# Patient Record
Sex: MALE | Race: WHITE | NOT HISPANIC OR LATINO | Employment: FULL TIME | URBAN - METROPOLITAN AREA
[De-identification: names, ages, dates, MRNs, and addresses within clinical notes are randomized per-mention and may not be internally consistent; named-entity substitution may affect disease eponyms.]

---

## 2021-05-18 ENCOUNTER — NURSE TRIAGE (OUTPATIENT)
Dept: INTERNAL MEDICINE | Age: 31
End: 2021-05-18

## 2021-05-18 ENCOUNTER — EXTERNAL RECORD (OUTPATIENT)
Dept: HEALTH INFORMATION MANAGEMENT | Facility: OTHER | Age: 31
End: 2021-05-18

## 2021-05-18 ENCOUNTER — WALK IN (OUTPATIENT)
Dept: URGENT CARE | Age: 31
End: 2021-05-18

## 2021-05-18 DIAGNOSIS — R10.9 LEFT SIDED ABDOMINAL PAIN: Primary | ICD-10-CM

## 2021-05-18 DIAGNOSIS — R19.7 NAUSEA VOMITING AND DIARRHEA: ICD-10-CM

## 2021-05-18 DIAGNOSIS — F10.10 ALCOHOL ABUSE: ICD-10-CM

## 2021-05-18 DIAGNOSIS — R11.2 NAUSEA VOMITING AND DIARRHEA: ICD-10-CM

## 2021-05-18 DIAGNOSIS — F14.10 COCAINE ABUSE (CMD): ICD-10-CM

## 2021-05-18 PROCEDURE — 99202 OFFICE O/P NEW SF 15 MIN: CPT | Performed by: FAMILY MEDICINE

## 2021-05-18 ASSESSMENT — PAIN SCALES - GENERAL: PAINLEVEL: 5-6

## 2021-05-27 VITALS
OXYGEN SATURATION: 99 % | SYSTOLIC BLOOD PRESSURE: 130 MMHG | HEART RATE: 70 BPM | RESPIRATION RATE: 20 BRPM | TEMPERATURE: 96.7 F | DIASTOLIC BLOOD PRESSURE: 82 MMHG

## 2023-04-23 ENCOUNTER — WALK IN (OUTPATIENT)
Dept: URGENT CARE | Age: 33
End: 2023-04-23
Attending: EMERGENCY MEDICINE

## 2023-04-23 VITALS
WEIGHT: 190 LBS | TEMPERATURE: 98.2 F | DIASTOLIC BLOOD PRESSURE: 67 MMHG | HEART RATE: 65 BPM | SYSTOLIC BLOOD PRESSURE: 113 MMHG | OXYGEN SATURATION: 96 % | RESPIRATION RATE: 18 BRPM | BODY MASS INDEX: 28.79 KG/M2 | HEIGHT: 68 IN

## 2023-04-23 DIAGNOSIS — H61.23 BILATERAL IMPACTED CERUMEN: Primary | ICD-10-CM

## 2023-04-23 DIAGNOSIS — H92.02 OTALGIA OF LEFT EAR: ICD-10-CM

## 2023-04-23 DIAGNOSIS — H60.392 OTHER INFECTIVE ACUTE OTITIS EXTERNA OF LEFT EAR: ICD-10-CM

## 2023-04-23 DIAGNOSIS — H91.92 DECREASED HEARING OF LEFT EAR: ICD-10-CM

## 2023-04-23 PROCEDURE — 99204 OFFICE O/P NEW MOD 45 MIN: CPT

## 2023-04-23 RX ORDER — CIPROFLOXACIN AND DEXAMETHASONE 3; 1 MG/ML; MG/ML
4 SUSPENSION/ DROPS AURICULAR (OTIC) 2 TIMES DAILY
Qty: 7.5 ML | Refills: 0 | Status: SHIPPED | OUTPATIENT
Start: 2023-04-23 | End: 2023-04-30

## 2023-04-23 ASSESSMENT — ENCOUNTER SYMPTOMS
FEVER: 0
VOMITING: 0
DIZZINESS: 0
HEADACHES: 0
ABDOMINAL PAIN: 0
EYE REDNESS: 0
SHORTNESS OF BREATH: 0
NAUSEA: 0
DIARRHEA: 0
SPUTUM PRODUCTION: 0
HEMOPTYSIS: 0
WHEEZING: 0
SORE THROAT: 0
DIAPHORESIS: 0
CHILLS: 0
WEAKNESS: 0
EYE PAIN: 0
EYE DISCHARGE: 0
COUGH: 0

## 2023-04-23 ASSESSMENT — PAIN SCALES - GENERAL: PAINLEVEL: 10

## 2023-08-11 ENCOUNTER — WALK IN (OUTPATIENT)
Dept: URGENT CARE | Age: 33
End: 2023-08-11
Attending: FAMILY MEDICINE

## 2023-08-11 VITALS
OXYGEN SATURATION: 99 % | TEMPERATURE: 98.1 F | RESPIRATION RATE: 18 BRPM | SYSTOLIC BLOOD PRESSURE: 124 MMHG | HEART RATE: 70 BPM | DIASTOLIC BLOOD PRESSURE: 76 MMHG

## 2023-08-11 DIAGNOSIS — R10.84 GENERALIZED ABDOMINAL PAIN: ICD-10-CM

## 2023-08-11 DIAGNOSIS — R19.7 DIARRHEA, UNSPECIFIED TYPE: Primary | ICD-10-CM

## 2023-08-11 DIAGNOSIS — R68.83 CHILLS: ICD-10-CM

## 2023-08-11 LAB
INTERNAL PROCEDURAL CONTROLS ACCEPTABLE: YES
S PYO AG THROAT QL IA.RAPID: NEGATIVE

## 2023-08-11 PROCEDURE — 99213 OFFICE O/P EST LOW 20 MIN: CPT

## 2023-08-11 PROCEDURE — 87880 STREP A ASSAY W/OPTIC: CPT | Performed by: FAMILY MEDICINE

## 2023-08-11 ASSESSMENT — ENCOUNTER SYMPTOMS
NAUSEA: 0
SINUS PRESSURE: 0
FATIGUE: 1
STRIDOR: 0
ABDOMINAL PAIN: 1
VOMITING: 0
VOICE CHANGE: 0
HEADACHES: 0
COUGH: 0
SPEECH DIFFICULTY: 0
POLYDIPSIA: 0
POLYPHAGIA: 0
BLOOD IN STOOL: 0
SORE THROAT: 0
CONSTIPATION: 0
AGITATION: 0
EYE PAIN: 0
COLOR CHANGE: 0
WOUND: 0
DIARRHEA: 1
ADENOPATHY: 0
BACK PAIN: 0
WEAKNESS: 0
SHORTNESS OF BREATH: 0
NERVOUS/ANXIOUS: 0
EYE DISCHARGE: 0
BRUISES/BLEEDS EASILY: 0
CHEST TIGHTNESS: 0
WHEEZING: 0
DIZZINESS: 0
EYE REDNESS: 0
SINUS PAIN: 0
FEVER: 0

## 2023-08-11 ASSESSMENT — PAIN SCALES - GENERAL: PAINLEVEL: 4

## 2023-09-11 ENCOUNTER — APPOINTMENT (OUTPATIENT)
Dept: GENERAL RADIOLOGY | Age: 33
End: 2023-09-11
Attending: EMERGENCY MEDICINE

## 2023-09-11 ENCOUNTER — HOSPITAL ENCOUNTER (EMERGENCY)
Age: 33
Discharge: HOME | End: 2023-09-11
Attending: EMERGENCY MEDICINE

## 2023-09-11 VITALS
WEIGHT: 195.22 LBS | OXYGEN SATURATION: 96 % | SYSTOLIC BLOOD PRESSURE: 126 MMHG | HEART RATE: 54 BPM | TEMPERATURE: 97.6 F | DIASTOLIC BLOOD PRESSURE: 76 MMHG | BODY MASS INDEX: 29.59 KG/M2 | RESPIRATION RATE: 14 BRPM | HEIGHT: 68 IN

## 2023-09-11 DIAGNOSIS — R07.9 CHEST PAIN WITH LOW RISK FOR CARDIAC ETIOLOGY: Primary | ICD-10-CM

## 2023-09-11 LAB
ALBUMIN SERPL-MCNC: 3.6 G/DL (ref 3.6–5.1)
ALBUMIN/GLOB SERPL: 1.1 {RATIO} (ref 1–2.4)
ALP SERPL-CCNC: 97 UNITS/L (ref 45–117)
ALT SERPL-CCNC: 30 UNITS/L
AMPHETAMINES UR QL SCN>500 NG/ML: NEGATIVE
ANION GAP SERPL CALC-SCNC: 14 MMOL/L (ref 7–19)
APPEARANCE UR: CLEAR
AST SERPL-CCNC: 19 UNITS/L
BARBITURATES UR QL SCN>200 NG/ML: NEGATIVE
BASOPHILS # BLD: 0 K/MCL (ref 0–0.3)
BASOPHILS NFR BLD: 1 %
BENZODIAZ UR QL SCN>200 NG/ML: NEGATIVE
BILIRUB SERPL-MCNC: 0.2 MG/DL (ref 0.2–1)
BILIRUB UR QL STRIP: NEGATIVE
BUN SERPL-MCNC: 11 MG/DL (ref 6–20)
BUN/CREAT SERPL: 14 (ref 7–25)
BZE UR QL SCN>150 NG/ML: NEGATIVE
CALCIUM SERPL-MCNC: 8.6 MG/DL (ref 8.4–10.2)
CANNABINOIDS UR QL SCN>50 NG/ML: NEGATIVE
CHLORIDE SERPL-SCNC: 103 MMOL/L (ref 97–110)
CO2 SERPL-SCNC: 26 MMOL/L (ref 21–32)
COLOR UR: YELLOW
CREAT SERPL-MCNC: 0.79 MG/DL (ref 0.67–1.17)
D DIMER PPP FEU-MCNC: 0.2 MG/L (FEU)
DEPRECATED RDW RBC: 38.3 FL (ref 39–50)
EGFRCR SERPLBLD CKD-EPI 2021: >90 ML/MIN/{1.73_M2}
EOSINOPHIL # BLD: 0.3 K/MCL (ref 0–0.5)
EOSINOPHIL NFR BLD: 4 %
ERYTHROCYTE [DISTWIDTH] IN BLOOD: 12.7 % (ref 11–15)
FASTING DURATION TIME PATIENT: ABNORMAL H
FENTANYL UR QL SCN: NEGATIVE
GLOBULIN SER-MCNC: 3.2 G/DL (ref 2–4)
GLUCOSE SERPL-MCNC: 130 MG/DL (ref 70–99)
GLUCOSE UR STRIP-MCNC: NEGATIVE MG/DL
HCT VFR BLD CALC: 41.9 % (ref 39–51)
HGB BLD-MCNC: 14.3 G/DL (ref 13–17)
HGB UR QL STRIP: NEGATIVE
IMM GRANULOCYTES # BLD AUTO: 0 K/MCL (ref 0–0.2)
IMM GRANULOCYTES # BLD: 0 %
KETONES UR STRIP-MCNC: NEGATIVE MG/DL
LEUKOCYTE ESTERASE UR QL STRIP: NEGATIVE
LYMPHOCYTES # BLD: 2.5 K/MCL (ref 1–4.8)
LYMPHOCYTES NFR BLD: 34 %
MCH RBC QN AUTO: 28.7 PG (ref 26–34)
MCHC RBC AUTO-ENTMCNC: 34.1 G/DL (ref 32–36.5)
MCV RBC AUTO: 84.1 FL (ref 78–100)
MONOCYTES # BLD: 0.9 K/MCL (ref 0.3–0.9)
MONOCYTES NFR BLD: 12 %
NEUTROPHILS # BLD: 3.7 K/MCL (ref 1.8–7.7)
NEUTROPHILS NFR BLD: 49 %
NITRITE UR QL STRIP: NEGATIVE
NRBC BLD MANUAL-RTO: 0 /100 WBC
OPIATES UR QL SCN>300 NG/ML: NEGATIVE
PCP UR QL SCN>25 NG/ML: NEGATIVE
PH UR STRIP: 6 [PH] (ref 5–7)
PLATELET # BLD AUTO: 234 K/MCL (ref 140–450)
POTASSIUM SERPL-SCNC: 3.5 MMOL/L (ref 3.4–5.1)
PROT SERPL-MCNC: 6.8 G/DL (ref 6.4–8.2)
PROT UR STRIP-MCNC: NEGATIVE MG/DL
RBC # BLD: 4.98 MIL/MCL (ref 4.5–5.9)
SODIUM SERPL-SCNC: 139 MMOL/L (ref 135–145)
SP GR UR STRIP: 1.02 (ref 1–1.03)
TROPONIN I SERPL DL<=0.01 NG/ML-MCNC: 7 NG/L
UROBILINOGEN UR STRIP-MCNC: 0.2 MG/DL
WBC # BLD: 7.5 K/MCL (ref 4.2–11)

## 2023-09-11 PROCEDURE — 80307 DRUG TEST PRSMV CHEM ANLYZR: CPT

## 2023-09-11 PROCEDURE — 81003 URINALYSIS AUTO W/O SCOPE: CPT | Performed by: EMERGENCY MEDICINE

## 2023-09-11 PROCEDURE — 99285 EMERGENCY DEPT VISIT HI MDM: CPT

## 2023-09-11 PROCEDURE — 10004651 HB RX, NO CHARGE ITEM: Performed by: EMERGENCY MEDICINE

## 2023-09-11 PROCEDURE — 85025 COMPLETE CBC W/AUTO DIFF WBC: CPT | Performed by: EMERGENCY MEDICINE

## 2023-09-11 PROCEDURE — 80053 COMPREHEN METABOLIC PANEL: CPT | Performed by: EMERGENCY MEDICINE

## 2023-09-11 PROCEDURE — 71045 X-RAY EXAM CHEST 1 VIEW: CPT

## 2023-09-11 PROCEDURE — 93005 ELECTROCARDIOGRAM TRACING: CPT | Performed by: EMERGENCY MEDICINE

## 2023-09-11 PROCEDURE — 84484 ASSAY OF TROPONIN QUANT: CPT | Performed by: EMERGENCY MEDICINE

## 2023-09-11 PROCEDURE — 85379 FIBRIN DEGRADATION QUANT: CPT

## 2023-09-11 PROCEDURE — 36415 COLL VENOUS BLD VENIPUNCTURE: CPT

## 2023-09-11 RX ORDER — ASPIRIN 81 MG/1
324 TABLET, CHEWABLE ORAL ONCE
Status: COMPLETED | OUTPATIENT
Start: 2023-09-11 | End: 2023-09-11

## 2023-09-11 RX ADMIN — ASPIRIN 81 MG 324 MG: 81 TABLET ORAL at 06:51

## 2023-09-11 ASSESSMENT — HEART SCORE
AGE: LESS THAN OR EQUAL TO 45
HEART SCORE: 1
TROPONIN: EQUAL OR LESS THAN NORMAL LIMIT
EKG: NORMAL
AGE: LESS THAN OR EQUAL TO 45
EKG: NORMAL
HEART SCORE: 0
HISTORY: SLIGHTLY SUSPICIOUS
HISTORY: SLIGHTLY SUSPICIOUS
RISK FACTORS: NO RISK FACTORS KNOWN
RISK FACTORS: 1-2 RISK FACTORS
TROPONIN: EQUAL OR LESS THAN NORMAL LIMIT

## 2023-09-11 ASSESSMENT — PAIN SCALES - GENERAL: PAINLEVEL_OUTOF10: 5

## 2023-09-11 ASSESSMENT — VISUAL ACUITY: OU: 1

## 2023-09-12 LAB
P AXIS (DEGREES): 42
PR-INTERVAL (MSEC): 181
QRS-INTERVAL (MSEC): 110
QT-INTERVAL (MSEC): 415
QTC: 403
R AXIS (DEGREES): 51
REPORT TEXT: NORMAL
T AXIS (DEGREES): 52
VENTRICULAR RATE EKG/MIN (BPM): 55

## 2024-01-03 ENCOUNTER — WALK IN (OUTPATIENT)
Dept: URGENT CARE | Age: 34
End: 2024-01-03

## 2024-01-03 VITALS
RESPIRATION RATE: 16 BRPM | OXYGEN SATURATION: 96 % | SYSTOLIC BLOOD PRESSURE: 122 MMHG | DIASTOLIC BLOOD PRESSURE: 76 MMHG | HEART RATE: 76 BPM | TEMPERATURE: 97.1 F

## 2024-01-03 DIAGNOSIS — S39.012A BACK STRAIN, INITIAL ENCOUNTER: Primary | ICD-10-CM

## 2024-01-03 PROCEDURE — 99214 OFFICE O/P EST MOD 30 MIN: CPT | Performed by: FAMILY MEDICINE

## 2024-01-03 RX ORDER — ONDANSETRON 4 MG/1
TABLET, ORALLY DISINTEGRATING ORAL
COMMUNITY
Start: 2021-05-18

## 2024-01-03 RX ORDER — BACLOFEN 10 MG/1
10 TABLET ORAL 3 TIMES DAILY PRN
Qty: 30 TABLET | Refills: 0 | Status: SHIPPED | OUTPATIENT
Start: 2024-01-03

## 2024-01-03 RX ORDER — ACETAMINOPHEN 500 MG
TABLET ORAL
COMMUNITY
Start: 2021-05-18

## 2024-01-03 RX ORDER — FAMOTIDINE 20 MG/1
TABLET, FILM COATED ORAL
COMMUNITY
Start: 2021-05-18

## 2024-01-03 RX ORDER — AZITHROMYCIN 250 MG/1
TABLET, FILM COATED ORAL
COMMUNITY
Start: 2023-12-11 | End: 2024-01-03 | Stop reason: ALTCHOICE

## 2024-01-03 RX ORDER — NABUMETONE 750 MG/1
750 TABLET, FILM COATED ORAL 2 TIMES DAILY PRN
Qty: 20 TABLET | Refills: 0 | Status: SHIPPED | OUTPATIENT
Start: 2024-01-03 | End: 2024-01-13

## 2024-01-03 RX ORDER — TRAMADOL HYDROCHLORIDE 50 MG/1
TABLET ORAL
COMMUNITY
Start: 2019-11-13

## 2024-01-03 ASSESSMENT — ENCOUNTER SYMPTOMS: BACK PAIN: 1

## 2024-01-04 ENCOUNTER — TELEPHONE (OUTPATIENT)
Dept: URGENT CARE | Age: 34
End: 2024-01-04

## 2024-01-04 ENCOUNTER — WALK IN (OUTPATIENT)
Dept: URGENT CARE | Age: 34
End: 2024-01-04

## 2024-01-04 VITALS
HEART RATE: 72 BPM | SYSTOLIC BLOOD PRESSURE: 108 MMHG | OXYGEN SATURATION: 100 % | DIASTOLIC BLOOD PRESSURE: 60 MMHG | TEMPERATURE: 97.5 F | RESPIRATION RATE: 20 BRPM

## 2024-01-04 DIAGNOSIS — M54.9 ACUTE BACK PAIN, UNSPECIFIED BACK LOCATION, UNSPECIFIED BACK PAIN LATERALITY: Primary | ICD-10-CM

## 2024-01-04 PROCEDURE — 99214 OFFICE O/P EST MOD 30 MIN: CPT | Performed by: FAMILY MEDICINE

## 2024-01-10 ENCOUNTER — ORDER TRANSCRIPTION (OUTPATIENT)
Dept: PHYSICAL THERAPY | Facility: HOSPITAL | Age: 34
End: 2024-01-10

## 2024-01-10 DIAGNOSIS — M54.42 ACUTE MIDLINE LOW BACK PAIN WITH LEFT-SIDED SCIATICA: Primary | ICD-10-CM

## 2024-01-12 ENCOUNTER — TELEPHONE (OUTPATIENT)
Dept: URGENT CARE | Age: 34
End: 2024-01-12

## 2024-01-12 ENCOUNTER — TELEPHONE (OUTPATIENT)
Dept: PHYSICAL THERAPY | Facility: HOSPITAL | Age: 34
End: 2024-01-12

## 2024-01-15 ENCOUNTER — APPOINTMENT (OUTPATIENT)
Dept: PHYSICAL THERAPY | Age: 34
End: 2024-01-15
Attending: NURSE PRACTITIONER
Payer: OTHER MISCELLANEOUS

## 2024-01-16 ENCOUNTER — TELEPHONE (OUTPATIENT)
Dept: PHYSICAL THERAPY | Facility: HOSPITAL | Age: 34
End: 2024-01-16

## 2024-01-17 ENCOUNTER — OFFICE VISIT (OUTPATIENT)
Dept: PHYSICAL THERAPY | Age: 34
End: 2024-01-17
Payer: OTHER MISCELLANEOUS

## 2024-01-17 ENCOUNTER — APPOINTMENT (OUTPATIENT)
Dept: PHYSICAL THERAPY | Age: 34
End: 2024-01-17
Attending: NURSE PRACTITIONER
Payer: OTHER MISCELLANEOUS

## 2024-01-17 DIAGNOSIS — M54.42 ACUTE MIDLINE LOW BACK PAIN WITH LEFT-SIDED SCIATICA: Primary | ICD-10-CM

## 2024-01-17 PROCEDURE — 97162 PT EVAL MOD COMPLEX 30 MIN: CPT

## 2024-01-17 PROCEDURE — 97110 THERAPEUTIC EXERCISES: CPT

## 2024-01-17 NOTE — PROGRESS NOTES
SPINE EVALUATION:     Diagnosis:   Acute midline low back pain with left-sided sciatica (M54.42)       Referring Provider: Juve  Date of Evaluation:    1/17/2024    Precautions:  None Next MD visit:   none scheduled  Date of Surgery: n/a     PATIENT SUMMARY   Arturo Chaudhary is a 33 year old male who presents to therapy today with complaints of central and L back pain after lifting and twisting with a box from the ground at work on 1/2/24. 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 He was able to work the rest of the shift on the date of injury, but he has been unable to return to work due to pain. 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 The lifting height is usually variable from floor to shoulder height as he unloads pallets.  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 The pain has gone down from the first week as he was limited by walking in a forward flexed posture, but has not significantly improved from the first week. The pain radiates to the knee along the front of the thigh; this is a sharp pain that sometimes extending to the shin. The pain in the back is a sharp pain. The pain feels like its more nerve instead of muscular and it is constant. The knee pain is a shooting pain the comes and goes. Moving around bothers him, but rest seems to help. He is able to  his child but feels pain, sleep positioning, bending forward and donning shoes/socks. He has been staying away from pain medications. He has been icing and using heat but the ice helps the most. He got some lidocaine patches today from the MD so he is going to try those today. He denies any numbness or tingling.  Pt describes pain level current 5/10, at best 5/10, at worst 9/10.   Current functional limitations include lifting heavy/avoiding all lifting, prolonged  standing/walking, pain with bending forward, donning shoes, sleep positioning; lifting and standing restrictions limit him from returning to work at this time    Arturo describes prior level of function no restrictions with any lifting. Pt goals include pain free with lifting, and return to work.  Past medical history was reviewed with Arturo. No significant findings reported by patient  Pt denies diplopia, dysarthria, dysphasia, dizziness, drop attacks, bowel/bladder changes, saddle anesthesia, and PATRIA LE N/T.    ASSESSMENT  Arturo presents to physical therapy evaluation with primary c/o middle and L low back pain with intermittent pain into L leg. The results of the objective tests and measures show decreased lumbar AROM, increased tenderness of the paraspinals, QL, and glutes, decreased hip strength, and hypomobility of the lumbar vertebral joints which are all contributing to his pain complaints and limitations with ADLS/work duties. Functional deficits include but are not limited to lifting heavy, pain with repeated motions, donning shoes and sleep positioning.  Signs and symptoms are consistent with diagnosis of acute low back pain. Pt and PT discussed evaluation findings, pathology, POC and HEP.  Pt voiced understanding and performs HEP correctly without reported pain. Skilled Physical Therapy is medically necessary to address the above impairments and reach functional goals.     OBJECTIVE:   Observation/Posture: Patient stands with  level iliac crest, bilateral forefoot out toeing, medial rotation of bilateral femurs and increased right lateral lean  Neuro Screen: intact to light touch     Lumbar AROM: (* denotes performed with pain)  Flexion: 25% LOM no reversal of curve * left low back pain  Extension: 50%  LOM with pain in middle and L back *    Sidebending: R 25% LOM and pain* ; L 50% LOM with pain *  Rotation: R WFL and pain free ; L WFL with pain in the low back and buttocks *    Accessory motion: hypomobile  to L4 and L5 A/P at the spinous processes  Palpation: TTP to L quadratus lumborum, L superior glute, paraspinals, L SI joint. Increased visible swelling on the L paraspinals  compared to the R     Strength: (* denotes performed with pain)  LE   Hip flexion (L2): R 3+*/5; L 3+*/5  Hip abduction: R 3+*/5; L 3+*/5  Hip Extension: R 3+*/5; L 3*/5   Hip ER: R 4/5; L 4+/5  Hip IR: R 5/5; L 4+/5  Knee Flexion: R 5/5; L 5/5   Knee extension (L3): R 5/5; L 4+*/5   DF (L4): R 5/5; L 5/5  PF (S1): R 5/5; L 5/5    Lower abdominals: 1-/5       Flexibility:   LE   Hamstrings: R -50; L -40  Quads: R WFL; L WFL     Special tests:   Slump: Neg  SLR: R neg, L neg  Active SLR: R crossover pain to L, L neg   MICHAEL: No pain into leg   Spring SI: neg     Gait: pt ambulates on level ground with normal mechanics.  Balance: SLS R 5 sec*, L 6 sec *    Today’s Treatment and Response:   Pt education was provided on exam findings, treatment diagnosis, treatment plan, expectations, and prognosis. Pt was also provided recommendations for possible soreness after evaluation and modalities as needed [ice/heat]  Patient was instructed in and issued a HEP for: MICHAEL, lumbar trunk rotations, brace and march in supine     Charges: PT Eval Moderate Complexity, TherEx x1       Total Timed Treatment: 8 min     Total Treatment Time: 45 min Interventions performed at the initial evaluation: MICHAEL x 1 min, prone lying x 2 min, LTR x 10 and brace with march x 5 alternating      Based on clinical rationale and outcome measures, this evaluation involved Moderate Complexity decision making due to 1-2 personal factors/comorbidities, 3 body structures involved/activity limitations, and unstable symptoms including changing pain levels and shooting pain into L leg intermittently  .  PLAN OF CARE:    Goals: (to be met in 8 visits)   Pt will report 90% or more decrease in pain with motion throughout the day to allow him to return to work.   Pt will increase hip strength by  1/2 grade or more to be able to lift heavy objects off the ground to complete work duties   Pt will report a 80% decrease in the instances of shooting pain into the L leg throughout the day to be able stand to transfer boxes pain free at work.   Pt will increase lumbar ROM by 25% to 50% to be able to bend and squat to  boxes from pallet at work.   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  Pt will be independent with progressive HEP in 2 weeks.     Frequency / Duration: Patient will be seen for 2 x/week or a total of 8 visits over a 90 day period. Treatment will include: Manual Therapy, Neuromuscular Re-education, Therapeutic Exercise, and Home Exercise Program instruction    Education or treatment limitation: None  Rehab Potential:good    Oswestry Disability Index Score  No data recorded - to be taken next visit    Patient/Family/Caregiver was advised of these findings, precautions, and treatment options and has agreed to actively participate in planning and for this course of care.    Thank you for your referral. Please co-sign or sign and return this letter via fax as soon as possible to [phone]. If you have any questions, please contact me at Dept: [phone]    Sincerely,  Electronically signed by therapist: Geovanny Horn    Physician's certification required: Yes  I certify the need for these services furnished under this plan of treatment and while under my care.    X___________________________________________________ Date____________________    Certification From: 1/17/2024  To:4/16/2024

## 2024-01-18 ENCOUNTER — OFFICE VISIT (OUTPATIENT)
Dept: PHYSICAL THERAPY | Age: 34
End: 2024-01-18
Attending: NURSE PRACTITIONER
Payer: OTHER MISCELLANEOUS

## 2024-01-18 PROCEDURE — 97140 MANUAL THERAPY 1/> REGIONS: CPT

## 2024-01-18 PROCEDURE — 97110 THERAPEUTIC EXERCISES: CPT

## 2024-01-18 NOTE — PROGRESS NOTES
Diagnosis:   Acute midline low back pain with left-sided sciatica (M54.42)          Referring Provider: Juve  Date of Evaluation:    11/17/24    Precautions:  None Next MD visit:   none scheduled  Date of Surgery: n/a   Insurance Primary/Secondary: WORK[employer] COMP / N/A     # Auth Visits: 8  auth  until  4/16/24      Subjective: Patient reports that he is more sore in general since the IE yesterday. He has not tried the exercises at home because he has been sore. He is sore in the L low back and not in the leg currently, but still has the same amount of shooting pain into the leg. He states that his pain is still constant in the back.     Pain: 5-6/10      Objective: See treatment log     8 min ice to the low back in sitting post treatment    Assessment: Patient had some discomfort along the medial lumbar paraspinal group with min relief with STM. He was not able to tolerate joint mob directly to spinous process due to pain. He did have some minimal hip pain with seated clams this session; no increase over the baseline pain in the low back with ther ex  or at the end of this session. Trial of repeated flexion in sitting and the patient had better overall standing flexion with less pain suggesting that he might be more of a flexion responder at this time.  Focused HEP on flexion and gentle strengthening/mobility.       Goals:   (to be met in 8 visits)   Pt will report 90% or more decrease in pain with motion throughout the day to allow him to return to work.   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   Pt will increase lumbar ROM by 25% to 50% to be able to bend and squat to  boxes from pallet at work.   Pt will increase low ab strength to 2/5 as needed to control low back stresses  with twisting to transfer boxes to the hopper at work.  Pt will be independent with progressive HEP in 2 weeks.     Plan: Continued with additional flexibility and strengthening to improve his pain complaints; consider IASTM and reassess response to flexion by added SB and additional core stability.   Date:   1/18/24              TX#: 2/8 Date:                 TX#: 4/ Date:                 TX#: 5/ Date:   Tx#: 6/   Man:   STM to L QL,L paraspinals -4 min   Joint mob to L4-L5  bilateral, unilateral PA  grade I-II- 4 min       MICHAEL with foam roll  under the ankles x 2 min    Supine:   LTR x 12- 2\" hold  BKFO x 10 R/L  Supine marches x 10  Side lying clams 1 x 15 R/L     Sitting:  Seated adduction isometric x 10, 3\"  Seated clams x 10 RTB   Seated repeated lumbar flexion 2 x 10 (comparable sign is standing flexion)    Standing:   Multifidus extension 2 x 5 R/L with trunk supported by plinth                  HEP: multifidus extension, repeated flexion, LTR, supine marches     Charges: 1 man (8 min), 2 ther ex (31 min)       Total Timed Treatment: 39 min  Total Treatment Time: 47  min     Certification From: 1/17/2024  To:4/16/2024

## 2024-01-23 ENCOUNTER — OFFICE VISIT (OUTPATIENT)
Dept: PHYSICAL THERAPY | Age: 34
End: 2024-01-23
Attending: NURSE PRACTITIONER
Payer: OTHER MISCELLANEOUS

## 2024-01-23 PROCEDURE — 97110 THERAPEUTIC EXERCISES: CPT

## 2024-01-23 PROCEDURE — 97140 MANUAL THERAPY 1/> REGIONS: CPT

## 2024-01-23 NOTE — PROGRESS NOTES
Diagnosis:   Acute midline low back pain with left-sided sciatica (M54.42)          Referring Provider: Juve  Date of Evaluation:    11/17/24    Precautions:  None Next MD visit:   none scheduled  Date of Surgery: n/a   Insurance Primary/Secondary: WORK[employer] COMP / N/A     # Auth Visits: 8  auth  until  4/16/24      Subjective: Patient reports that he still has soreness in his L low back. He is starting to feel pain on both sides. He feels sharp, shooting pain in the front of the thigh that does not go past the knee and it comes and goes. He feels the pain with walking and standing. He has been icing 3 times a day.     Pain: 6-7/10      Objective: See treatment log     8 min ice to the low back in sitting post treatment    Assessment: Patient had pain shooting into his leg with joint mob to L2 - L3 region. He felt a stretch in his hip flexors on the L with R single knee to chest exercise, which suggests tight hip flexor on the L. He had minimal increase in soreness with abdominal bracing with and without the ball in the L low back. He demonstrated improved standing flexion with increased range, increased speed and no LE symptoms after repeated sitting flexion exercise.    Goals:   (to be met in 8 visits)   Pt will report 90% or more decrease in pain with motion throughout the day to allow him to return to work.   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   Pt will increase lumbar ROM by 25% to 50% to be able to bend and squat to  boxes from pallet at work.   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  Pt will be independent with progressive HEP in 2 weeks.     Plan: Continued with additional  flexibility and strengthening to improve his pain complaints; Trial hip flexor stretch with Kade  Date:   1/18/24              TX#: 2/8 Date:    1/23/24             TX#: 3/8 Date:                 TX#: 5/ Date:   Tx#: 6/   Man:   STM to L QL,L paraspinals -4 min   Joint mob to L4-L5  bilateral, unilateral PA  grade I-II- 4 min  Man:  Joint mob to L2-L5, unilateral PA  grade I-II 6 min   IASTM with GT 4 and GT 3 to L paraspinals and QL x 5 min      MICHAEL with foam roll  under the ankles x 2 min    Supine:   LTR x 12- 2\" hold  BKFO x 10 R/L  Supine marches x 10  Side lying clams 1 x 15 R/L     Sitting:  Seated adduction isometric x 10, 3\"  Seated clams x 10 RTB   Seated repeated lumbar flexion 2 x 10 (comparable sign is standing flexion)    Standing:   Multifidus extension 2 x 5 R/L with trunk supported by plinth   Supine:   LTR x 15- 2\" hold  SL Knee to chest 2 x 5 R/L  GSB isometric Abd brace x5, 5 \"   TRA bracing in hooklying x5, 3\"   BKFO x 10 R/L  Supine marches x 10  Side lying clams 1 x 15 R/L     Sitting:  Seated adduction isometric x 10, 3\"  Seated clams x 10 RTB   SB roll out x5, 5\"   Seated repeated lumbar flexion 2 x 10 (comparable sign is standing flexion)    Standing:   Multifidus extension 2 x 5 R/L with trunk supported by plinth                 HEP: multifidus extension, repeated flexion, LTR, supine marches 1/18.  No progression this visit    Charges: 1 man (11 min), 2 ther ex (34 min)       Total Timed Treatment: 45 min  Total Treatment Time: 53  min     Certification From: 1/17/2024  To:4/16/2024

## 2024-01-24 ENCOUNTER — TELEPHONE (OUTPATIENT)
Dept: PHYSICAL THERAPY | Facility: HOSPITAL | Age: 34
End: 2024-01-24

## 2024-01-24 ENCOUNTER — APPOINTMENT (OUTPATIENT)
Dept: PHYSICAL THERAPY | Age: 34
End: 2024-01-24
Attending: NURSE PRACTITIONER
Payer: OTHER MISCELLANEOUS

## 2024-01-26 ENCOUNTER — OFFICE VISIT (OUTPATIENT)
Dept: PHYSICAL THERAPY | Age: 34
End: 2024-01-26
Attending: NURSE PRACTITIONER
Payer: OTHER MISCELLANEOUS

## 2024-01-26 PROCEDURE — 97110 THERAPEUTIC EXERCISES: CPT

## 2024-01-26 PROCEDURE — 97014 ELECTRIC STIMULATION THERAPY: CPT

## 2024-01-26 PROCEDURE — 97112 NEUROMUSCULAR REEDUCATION: CPT

## 2024-01-26 NOTE — PROGRESS NOTES
Diagnosis:   Acute midline low back pain with left-sided sciatica (M54.42)          Referring Provider: Juve  Date of Evaluation:    11/17/24    Precautions:  None Next MD visit:   none scheduled  Date of Surgery: n/a   Insurance Primary/Secondary: WORK[employer] COMP / N/A     # Auth Visits: 8  auth  until  4/16/24      Subjective: Patient reports that on Wednesday he had more leg pain at anterior thigh, left lateral leg and into the lateral foot; this was a pain feeling more than a numbness/tingling.  This sharp pain on that night was very intense for 2 hours starting at 6 pm.  His pain is constant in the low back and then the pain is intermittent in the left leg.  The back pain is very uncomfortable; he did not have any increase in symptoms after the last session.    For sleep the back is really stiff.     Pain: 6-7/10      Objective: See treatment log     8 min ice to the low back in sitting post treatment    Assessment: Patient does have a trigger points in the lumbar musculature on the left which might be contributing to his left-sided pain complaints. He continues to have pain that restrictions his active ranges of motion and also ability to reach, lift and carry. No exercises increased his symptoms over his baseline pain this session or caused any LE symptoms. Added IFC this visit to help decrease his pain as needed to manage symptoms.     Goals:   (to be met in 8 visits)   Pt will report 90% or more decrease in pain with motion throughout the day to allow him to return to work.   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   Pt will increase lumbar ROM by 25% to 50% to be able to bend and squat to  boxes from pallet at work.   Pt will increase low ab strength to 2/5 as needed to  control low back stresses with twisting to transfer boxes to the hopper at work.  Pt will be independent with progressive HEP in 2 weeks.     Plan: Continued with additional flexibility and strengthening to improve his pain complaints; continue with strengthening and flexibility to decrease pain with IASTM and posterior chain mobility. Reassess IFC and consider adding TDN.  Date:   1/18/24              TX#: 2/8 Date:    1/23/24             TX#: 3/8 Date:  1/26/24               TX#: 4/8 Date:   Tx#:    Man:   STM to L QL,L paraspinals -4 min   Joint mob to L4-L5  bilateral, unilateral PA  grade I-II- 4 min  Man:  Joint mob to L2-L5, unilateral PA  grade I-II 6 min   IASTM with GT 4 and GT 3 to L paraspinals and QL x 5 min  Man:  Joint mob to L2-L5, unilateral PA  grade I-II 3 min  STM grade III soft tissue mobilization to the left and right lumbar musculature   IASTM with GT 4 and GT 3 to L paraspinals and QL with TrP release at L2/3 on the left x 5 min     MICHAEL with foam roll  under the ankles x 2 min    Supine:   LTR x 12- 2\" hold  BKFO x 10 R/L  Supine marches x 10  Side lying clams 1 x 15 R/L     Sitting:  Seated adduction isometric x 10, 3\"  Seated clams x 10 RTB   Seated repeated lumbar flexion 2 x 10 (comparable sign is standing flexion)    Standing:   Multifidus extension 2 x 5 R/L with trunk supported by plinth   Supine:   LTR x 15- 2\" hold  SL Knee to chest 2 x 5 R/L  GSB isometric Abd brace x5, 5 \"   TRA bracing in hooklying x5, 3\"   BKFO x 10 R/L  Supine marches x 10  Side lying clams 1 x 15 R/L     Sitting:  Seated adduction isometric x 10, 3\"  Seated clams x 10 RTB   SB roll out x5, 5\"   Seated repeated lumbar flexion 2 x 10 (comparable sign is standing flexion)    Standing:   Multifidus extension 2 x 5 R/L with trunk supported by plinth Prone:  Left prone knee flexion for nerve mobility 2 x 10  Prone foot pushes x 10, 3\"  Supine:  LTR x 15- 2\" hold  PROM SKTC, Hip ER and HS stretch bilaterally     Hooklying isometric adduction x 10, 3\"  TRA bracing in hooklying 2 x5, 3\"   BKFO x 10 R/L  Clamshells in supine 2 x 10 RTT  Side lying clams 1 x 15 R/L  ND    Sitting:    Seated repeated lumbar flexion 2 x 10 (comparable sign is standing flexion)    Standing:   Multifidus extension 2 x 5 R/L with trunk supported by plinth      Modalities: IFC x 10 min post treatment: continuous 21 V with ice in supine          HEP: multifidus extension, repeated flexion, LTR, supine [facility] 1/18.  No progression this visit    Charges: 1 man (11 min), 2 ther ex (34 min)   1 IFC (10 min)    Total Timed Treatment: 45 min  Total Treatment Time: 55  min     Certification From: 1/17/2024  To:4/16/2024

## 2024-01-31 ENCOUNTER — OFFICE VISIT (OUTPATIENT)
Dept: PHYSICAL THERAPY | Age: 34
End: 2024-01-31
Attending: NURSE PRACTITIONER
Payer: OTHER MISCELLANEOUS

## 2024-01-31 PROCEDURE — 97140 MANUAL THERAPY 1/> REGIONS: CPT

## 2024-01-31 PROCEDURE — 97014 ELECTRIC STIMULATION THERAPY: CPT

## 2024-01-31 PROCEDURE — 97110 THERAPEUTIC EXERCISES: CPT

## 2024-01-31 NOTE — PROGRESS NOTES
Diagnosis:   Acute midline low back pain with left-sided sciatica (M54.42)          Referring Provider: Juve  Date of Evaluation:    11/17/24    Precautions:  None Next MD visit:   none scheduled  Date of Surgery: n/a   Insurance Primary/Secondary: [employer] / N/A     # Auth Visits: 8  auth  until  4/16/24      Subjective: Patient reports that the leg pain still comes and goes and isn't constant but it happens throughout the day.  The back pain seems about the same as it was. Most of the time the pain is at the anterior thigh. He is currently having pain at the left side of the low back.      Pain: 6/10      Objective: See treatment log       TDN screening:  Patient answered no to the following screening questions or as indicated below    1. No allergy to chromium or nickel  2. No system or  blood borne infections present (I.e. Hepatitis, HIV)  3. No presence of pacemaker or cosmetic implant  4. Not taking anticoagulant therapies     Trigger Point Dry Needling  Therapist provided thorough explanation of risks and benefits of trigger dry needling.  Patient provided verbal informed consent to receive dry needling services.  Intervention was provided by Fabiola Varela PT, DTP, OCS, CMTPT    Procedure:  Functional assessment sign (pre-test): flexion    Anatomical region/Muscle(s) treated: lumbar multifidi   Pistoning technique used with needle size:  30 x 50  Twitch elicited (Y/N):  n - pain  Patient reported no adverse effects following intervention.    Re-assessment sign (post-test):flexion      Assessment: Trial of TDN this visit to see if this helps to manage his pain complaints in the back as needed to improve his baseline symptoms as well as his tolerance with prolonged positions or repeated movements during work duties as he does repetitive lifting activities. He is relatively more flexible in the low back vs hips which will cause him to compensate with the lumbar spine.  Need to continue with  progressive strengthening and flexibility to help decrease his pain and allow him to return to lifting and squatting safely.   Goals:   (to be met in 8 visits)   Pt will report 90% or more decrease in pain with motion throughout the day to allow him to return to work.   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   Pt will increase lumbar ROM by 25% to 50% to be able to bend and squat to  boxes from pallet at work.   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  Pt will be independent with progressive HEP in 2 weeks.     Plan: Continued with additional flexibility and strengthening to improve his pain complaints; continue with strengthening and flexibility to decrease pain with TDN, IASTM and posterior chain mobility. Reassess and continue with supine marches and pallof punches in the next few sessions.  Date:   1/18/24              TX#: 2/8 Date:    1/23/24             TX#: 3/8 Date:  1/26/24               TX#: 4/8 Date:1/31/24   Tx#: 5/8   Man:   STM to L QL,L paraspinals -4 min   Joint mob to L4-L5  bilateral, unilateral PA  grade I-II- 4 min  Man:  Joint mob to L2-L5, unilateral PA  grade I-II 6 min   IASTM with GT 4 and GT 3 to L paraspinals and QL x 5 min  Man:  Joint mob to L2-L5, unilateral PA  grade I-II 3 min  STM grade III soft tissue mobilization to the left and right lumbar musculature   IASTM with GT 4 and GT 3 to L paraspinals and QL with TrP release at L2/3 on the left x 5 min  Man:  Joint mob to L2-L5, unilateral PA  grade I-II 3 min  STM grade III soft tissue mobilization to the left and right lumbar musculature   IASTM with GT 4 and GT 3 to L paraspinals and QL with TrP  release at L2/3 on the left x 5 min    MICHAEL with foam roll  under the ankles x 2 min    Supine:   LTR x 12- 2\" hold  BKFO x 10 R/L  Supine marches x 10  Side lying clams 1 x 15 R/L     Sitting:  Seated adduction isometric x 10, 3\"  Seated clams x 10 RTB   Seated repeated lumbar flexion 2 x 10 (comparable sign is standing flexion)    Standing:   Multifidus extension 2 x 5 R/L with trunk supported by plinth   Supine:   LTR x 15- 2\" hold  SL Knee to chest 2 x 5 R/L  GSB isometric Abd brace x5, 5 \"   TRA bracing in hooklying x5, 3\"   BKFO x 10 R/L  Supine marches x 10  Side lying clams 1 x 15 R/L     Sitting:  Seated adduction isometric x 10, 3\"  Seated clams x 10 RTB   SB roll out x5, 5\"   Seated repeated lumbar flexion 2 x 10 (comparable sign is standing flexion)    Standing:   Multifidus extension 2 x 5 R/L with trunk supported by plinth Prone:  Left prone knee flexion for nerve mobility 2 x 10  Prone foot pushes x 10, 3\"  Supine:  LTR x 15- 2\" hold  PROM SKTC, Hip ER and HS stretch bilaterally    Hooklying isometric adduction x 10, 3\"  TRA bracing in hooklying 2 x5, 3\"   BKFO x 10 R/L  Clamshells in supine 2 x 10 RTT  Side lying clams 1 x 15 R/L  ND    Sitting:    Seated repeated lumbar flexion 2 x 10 (comparable sign is standing flexion)     Standing:   Multifidus extension 2 x 5 R/L with trunk supported by plinth Ther ex:     Prone:  Left prone knee flexion for nerve mobility 2 x 10  Prone foot pushes x 10, 3\"  Supine:  LTR x 15- 2\" hold  PROM SKTC, Hip ER and KTOS Left and right  Hooklying isometric adduction x 10, 3\"  TRA bracing in hooklying x 10, 3\"   BKFO x 10 R/L  Clamshells in supine 2 x 10 RTT  Side lying clams 1 x 15 R/L  ND  TDN as noted above  Sitting:    Seated repeated lumbar flexion 2 x 10 (comparable sign is standing flexion)  ND    Standing:   Multifidus extension 1 x 5 R/L with trunk supported by plinth     Modalities: IFC x 10 min post treatment: continuous 21 V with ice in supine Modalities: IFC  x 10 min post treatment: continuous 18 V with ice in supine         HEP: multifidus extension, repeated flexion, LTR, supine marches 1/18.  No progression this visit - reviewed strategies after TDN    Charges: 1 man (11 min), 2 ther ex (32 min)   1 IFC (10 min)    Total Timed Treatment: 43 min  Total Treatment Time: 53  min     Certification From: 1/17/2024  To:4/16/2024

## 2024-02-02 ENCOUNTER — OFFICE VISIT (OUTPATIENT)
Dept: PHYSICAL THERAPY | Age: 34
End: 2024-02-02
Attending: NURSE PRACTITIONER
Payer: OTHER MISCELLANEOUS

## 2024-02-02 PROCEDURE — 97014 ELECTRIC STIMULATION THERAPY: CPT

## 2024-02-02 PROCEDURE — 97140 MANUAL THERAPY 1/> REGIONS: CPT

## 2024-02-02 PROCEDURE — 97110 THERAPEUTIC EXERCISES: CPT

## 2024-02-02 NOTE — PROGRESS NOTES
Diagnosis:   Acute midline low back pain with left-sided sciatica (M54.42)          Referring Provider: Juve  Date of Evaluation:    11/17/24    Precautions:  None Next MD visit:   none scheduled  Date of Surgery: n/a   Insurance Primary/Secondary: [employer] / N/A     # Auth Visits: 8  auth  until  4/16/24      Subjective: Patient reports that  most of the pain in the in left side of the low back.  He thinks that yesterday there was a little change but today is back to the same pain in the back. Today he feels that he has more pain shooting into the anterior left thigh. The leg pain is a shooting pain and the back is a \"tense\" pain> he did have his MRI this morning.    Pain: 7/10      Objective: See treatment log       TDN screening:  Patient answered no to the following screening questions or as indicated below    1. No allergy to chromium or nickel  2. No system or  blood borne infections present (I.e. Hepatitis, HIV)  3. No presence of pacemaker or cosmetic implant  4. Not taking anticoagulant therapies     Trigger Point Dry Needling  Therapist provided thorough explanation of risks and benefits of trigger dry needling.  Patient provided verbal informed consent to receive dry needling services.  Intervention was provided by Fabiola Varela PT, BARBERP, OCS, CMTPT    Procedure:  Functional assessment sign (pre-test): flexion    Anatomical region/Muscle(s) treated: Iliocostalis lumborum   Pistoning technique used with needle size:  30 x 50  Twitch elicited (Y/N):  Y and pain   Patient reported no adverse effects following intervention.    Re-assessment sign (post-test):flexion      Assessment: Continued with TDN this session with another muscle group as he had the most restriction around the L4 level of the lumbar paraspinals.  He did have intermittent pain extending into the thighs bilaterally during intervention, but no significant change in his back pain symptoms.  He did have LTR noted with TDN this  visit which may help to decrease his pain as needed to improve his functional mobility while decreasing his baseline symptoms       Goals:   (to be met in 8 visits)   Pt will report 90% or more decrease in pain with motion throughout the day to allow him to return to work.   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   Pt will increase lumbar ROM by 25% to 50% to be able to bend and squat to  boxes from pallet at work.   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  Pt will be independent with progressive HEP in 2 weeks.     Plan: Continued with additional flexibility and strengthening to improve his pain complaints; continue with strengthening and flexibility to decrease pain with TDN, IASTM and posterior chain mobility with active HS stretch and also pallof punches  Date:    1/23/24             TX#: 3/8 Date:  1/26/24               TX#: 4/8 Date:1/31/24   Tx#: 5/8 Date:2/2/24   Tx#: 6/8   Man:  Joint mob to L2-L5, unilateral PA  grade I-II 6 min   IASTM with GT 4 and GT 3 to L paraspinals and QL x 5 min  Man:  Joint mob to L2-L5, unilateral PA  grade I-II 3 min  STM grade III soft tissue mobilization to the left and right lumbar musculature   IASTM with GT 4 and GT 3 to L paraspinals and QL with TrP release at L2/3 on the left x 5 min  Man:  Joint mob to L2-L5, unilateral PA  grade I-II 3 min  STM grade III soft tissue mobilization to the left and right lumbar musculature   IASTM with GT 4 and GT 3 to L paraspinals and QL with TrP release at L2/3 on the left x 5 min  Man:  Joint mob to L2-L5, unilateral PA  grade I-II 3 min  STM grade III soft tissue mobilization to the left and right  lumbar musculature   IASTM with GT 4 and GT 3 to L paraspinals and QL with TrP release at L4 on the left x 5 min      Supine:   LTR x 15- 2\" hold  SL Knee to chest 2 x 5 R/L  GSB isometric Abd brace x5, 5 \"   TRA bracing in hooklying x5, 3\"   BKFO x 10 R/L  Supine marches x 10  Side lying clams 1 x 15 R/L     Sitting:  Seated adduction isometric x 10, 3\"  Seated clams x 10 RTB   SB roll out x5, 5\"   Seated repeated lumbar flexion 2 x 10 (comparable sign is standing flexion)    Standing:   Multifidus extension 2 x 5 R/L with trunk supported by plinth Prone:  Left prone knee flexion for nerve mobility 2 x 10  Prone foot pushes x 10, 3\"  Supine:  LTR x 15- 2\" hold  PROM SKTC, Hip ER and HS stretch bilaterally    Hooklying isometric adduction x 10, 3\"  TRA bracing in hooklying 2 x5, 3\"   BKFO x 10 R/L  Clamshells in supine 2 x 10 RTT  Side lying clams 1 x 15 R/L  ND    Sitting:    Seated repeated lumbar flexion 2 x 10 (comparable sign is standing flexion)     Standing:   Multifidus extension 2 x 5 R/L with trunk supported by plinth Ther ex:     Prone:  Left prone knee flexion for nerve mobility 2 x 10  Prone foot pushes x 10, 3\"  Supine:  LTR x 15- 2\" hold  PROM SKTC, Hip ER and KTOS Left and right  Hooklying isometric adduction x 10, 3\"  TRA bracing in hooklying x 10, 3\"   BKFO x 10 R/L  Clamshells in supine 2 x 10 RTT  Side lying clams 1 x 15 R/L  ND  TDN as noted above  Sitting:    Seated repeated lumbar flexion 2 x 10 (comparable sign is standing flexion)  ND    Standing:   Multifidus extension 1 x 5 R/L with trunk supported by plinth Ther ex:     Prone:  Left prone knee flexion for nerve mobility 2 x 10  Prone foot pushes x 10, 3\"  Supine:  BKFO x 10 R/L  GSB LTR x 15  GSB DKTC x 15  PROM SKTC, Hip ER and KTOS Left and right  Hooklying isometric adduction x 10, 3\"  TRA bracing in hooklying x 10, 3\"   Supine marches x 10     Clamshells in supine 2 x 10 RTT    TDN as noted above  Sitting:    Seated repeated lumbar  flexion 2 x 10 (comparable sign is standing flexion)  ND    Standing:   Multifidus extension 1 x 5 R/L with trunk supported by plinth ND    Modalities: IFC x 10 min post treatment: continuous 21 V with ice in supine Modalities: IFC x 10 min post treatment: continuous 18 V with ice in supine Modalities: IFC x 10 min post treatment: continuous 19 V with ice in supine         HEP: multifidus extension, repeated flexion, LTR, supine marches 1/18.  Marches 2/2    Charges: 1 man (11 min), 2 ther ex (33 min)   1 IFC (10 min)    Total Timed Treatment: 44 min  Total Treatment Time: 54  min     Certification From: 1/17/2024  To:4/16/2024

## 2024-02-07 ENCOUNTER — OFFICE VISIT (OUTPATIENT)
Dept: PHYSICAL THERAPY | Age: 34
End: 2024-02-07
Attending: NURSE PRACTITIONER
Payer: OTHER MISCELLANEOUS

## 2024-02-07 PROCEDURE — 97110 THERAPEUTIC EXERCISES: CPT

## 2024-02-07 PROCEDURE — 97140 MANUAL THERAPY 1/> REGIONS: CPT

## 2024-02-07 PROCEDURE — 97014 ELECTRIC STIMULATION THERAPY: CPT

## 2024-02-09 ENCOUNTER — OFFICE VISIT (OUTPATIENT)
Dept: PHYSICAL THERAPY | Age: 34
End: 2024-02-09
Attending: NURSE PRACTITIONER
Payer: OTHER MISCELLANEOUS

## 2024-02-09 ENCOUNTER — TELEPHONE (OUTPATIENT)
Dept: PHYSICAL THERAPY | Facility: HOSPITAL | Age: 34
End: 2024-02-09

## 2024-02-09 PROCEDURE — 97110 THERAPEUTIC EXERCISES: CPT

## 2024-02-09 PROCEDURE — 97140 MANUAL THERAPY 1/> REGIONS: CPT

## 2024-02-09 PROCEDURE — 97014 ELECTRIC STIMULATION THERAPY: CPT

## 2024-02-09 NOTE — PROGRESS NOTES
Progress Summary  Pt has attended 8 visits in Physical Therapy.      Diagnosis:   Acute midline low back pain with left-sided sciatica (M54.42)          Referring Provider: Juve  Date of Evaluation:    11/17/24    Precautions:  None Next MD visit:   none scheduled  Date of Surgery: n/a   Insurance Primary/Secondary: [employer] / N/A     # Auth Visits: 8  auth  until  4/16/24      Subjective: Patient reports that he continues to have pain in the low back that extends into the left leg. He states he followed up with the MD yesterday and he was recommended to start with injections to see if that manages his pain as he had a MRI and was told that he has a herniated disc. He is having restrictions with sleeping and the MD is planning on writing him a Rx for Gabapentin.  He feels that overall his pain is up and down.  He locates numbness at the dorsum of the left foot, sharp shooting pain at the anterior leg all the way to the foot. He also describes a sharp, shooting pain in the left side of the buttocks and a stiffness/tightness in the left side of the low back.  He states the the leg symptoms are up and down with how far down the leg the symptoms extends.  Today he is not as sore as he was at the last session. His max pain has been 8/10.  He has had some improvement with donning shoes, but this is still painful at the end of the day.  He reports compliance with his HEP. 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     Current functional limitations include lifting heavy/avoiding all lifting, prolonged standing/walking, pain with bending forward, sleep positioning; lifting and standing restrictions limit him from returning to work at this time  Pain: 6-7/10      Objective: See treatment log     Observation/Posture: Patient stands with  level iliac  crest, bilateral forefoot out toeing, medial rotation of bilateral femurs and increased right lateral lean  Neuro Screen: intact to light touch     Lumbar AROM: (* denotes performed with pain)  Flexion: 25% decrease in range * left low back pain  Extension: 50% decrease in range and pain in the left low back *  Sidebending: R 25% LOM and pain central back* ; L 50% MALLORY with pain in the left low back*  Rotation: R WFL and pain free ; L WFL and pain free     Accessory motion: hypomobile to L4 and L5 A/P at the spinous processes  Palpation: TTP to L quadratus lumborum, left gluts, left lumbar paraspinals, L SI joint and left piriformis. Increased visible and palpable swelling on the L paraspinals  compared to the R     Strength: (* denotes performed with pain)  LE   Hip flexion (L2): R 4-/5; L 34-/5  Hip abduction: R 4/5; L 4/5  Hip Extension: R 3+*/5; L 3*/5   Hip ER: R 4+/5; L 4+/5  Hip IR: R 5/5; L 4+/5  Knee Flexion: R 5/5; L 5/5   Knee extension (L3): R 5/5; L 4+/5   DF (L4): R 5/5; L 5/5  PF (S1): R 5/5; L 5/5    Lower abdominals: 1+/5       Flexibility:   LE   Hamstrings: R -45; L -40  Quads: R WFL; L WFL     Special tests:   Slump: Neg  SLR: R neg, L neg  Active SLR: R crossover pain to L, L neg   MICHAEL: No pain into leg   VERONICA positive Left      Gait: pt ambulates on level ground with normal mechanics but decreased gait speed.  Balance: SLS R 10 sec*, L 11 sec *      TDN screening:  Patient answered no to the following screening questions or as indicated below    1. No allergy to chromium or nickel  2. No system or  blood borne infections present (I.e. Hepatitis, HIV)  3. No presence of pacemaker or cosmetic implant  4. Not taking anticoagulant therapies     Trigger Point Dry Needling  Therapist provided thorough explanation of risks and benefits of trigger dry needling.  Patient provided verbal informed consent to receive dry needling services.  Intervention was provided by Fabiola Varela PT, DTP,  OCS, CMTPT    Procedure:  Functional assessment sign (pre-test): flexion    Anatomical region/Muscle(s) treated: psoas and quadratus lumborum on left   Pistoning technique used with needle size: 30 x 75 and 30 x 60   Twitch elicited (Y/N): Y  Patient reported no adverse effects following intervention.    Re-assessment sign (post-test):flexion     Assessment: Patient has made gains in his range of motion and strenght since beginning physical therapy. He continues to have pain limiting his ability to perform flexion and insufficient strength and stability for lifting and carrying as needed to complete job duties; however,er his symptoms are less irritable with strength testing. He does have more radicular symptoms and would benefit from exercises aimed at centralizing his symptoms and improving his mobility to help manage back and leg pain. 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      Goals:   (to be met in 8 visits)   Pt will report 90% or more decrease in pain with motion throughout the day to allow him to return to work.  WORKING TOWARD   Pt will increase hip strength by 1/2 grade or more to be able to lift heavy objects off the ground to complete work duties WORKING TOWARD  Pt will report a 80% decrease in the instances of shooting pain into the L leg throughout the day to be able stand to transfer boxes pain free at work.  WORKING TOWARD   Pt will increase lumbar ROM by 25% to 50% to be able to bend and squat to  boxes from pallet at work. 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 WORKING TOWARD   Pt will be independent with progressive HEP in 2 weeks. MET      Plan:  Continue skilled Physical Therapy 2 x/week or a total of 10 visits over a 90 day period. Treatment will include: manual therapy, range of motion exercises, flexibility exercises, strengthening exercises, postural re-ed, neuromuscular re-education, CKC exercises, joint mobilization, IASTM, TDN, IFC, and HEP instruction all to improve her functional independence        Patient/Family/Caregiver was advised of these findings, precautions, and treatment options and has agreed to actively participate in planning and for this course of care.    Thank you for your referral. If you have any questions, please contact me at Dept: [phone].    Sincerely,  Electronically signed by therapist: Fabiola Varela PT     Physician's certification required:  Yes  Please co-sign or sign and return this letter via fax as soon as possible to [phone].   I certify the need for these services furnished under this plan of treatment and while under my care.    X___________________________________________________ Date____________________    Certification From: 2/9/2024  To:5/9/2024     Plan: Continued with additional flexibility and strengthening to improve his pain complaints; continue to decrease pain with TDN, IASTM and posterior chain mobility. Complete a reassessment next visit   Date:1/31/24   Tx#: 5/8 Date:2/2/24   Tx#: 6/8 Date:2/7/24   Tx#: 7/8 Date:2/9/24   Tx#: 8/8   Man:  Joint mob to L2-L5, unilateral PA  grade I-II 3 min  STM grade III soft tissue mobilization to the left and right lumbar musculature   IASTM with GT 4 and GT 3 to L paraspinals and QL with TrP release at L2/3 on the left x 5 min  Man:  Joint mob to L2-L5, unilateral PA  grade I-II 3 min  STM grade III soft tissue mobilization to the left and right lumbar musculature   IASTM with GT 4 and GT 3 to L paraspinals and QL with TrP release at L4 on the left x 5 min  Man:  Joint mob to L2-L5, unilateral PA  grade I-II 3 min  STM grade III soft tissue  mobilization to the left and right lumbar musculature   IASTM with GT 4 and GT 3 to L paraspinals and QL with TrP release at L4 on the left x 5 min  Man:  Joint mob to L2-L5, unilateral PA  grade I-II 3 min  STM to the left piriformis to decrease hip pain and symptoms into LE  IASTM with GT 4 and GT 3 to L paraspinals and QL with TrP release at L4 on the left x 5 min   TDN as listed above   Ther ex:     Prone:  Left prone knee flexion for nerve mobility 2 x 10  Prone foot pushes x 10, 3\"  Supine:  LTR x 15- 2\" hold  PROM SKTC, Hip ER and KTOS Left and right  Hooklying isometric adduction x 10, 3\"  TRA bracing in hooklying x 10, 3\"   BKFO x 10 R/L  Clamshells in supine 2 x 10 RTT  Side lying clams 1 x 15 R/L  ND  TDN as noted above  Sitting:    Seated repeated lumbar flexion 2 x 10 (comparable sign is standing flexion)  ND    Standing:   Multifidus extension 1 x 5 R/L with trunk supported by plinth Ther ex:     Prone:  Left prone knee flexion for nerve mobility 2 x 10  Prone foot pushes x 10, 3\"  Supine:  BKFO x 10 R/L  GSB LTR x 15  GSB DKTC x 15  PROM SKTC, Hip ER and KTOS Left and right  Hooklying isometric adduction x 10, 3\"  TRA bracing in hooklying x 10, 3\"   Supine marches x 10     Clamshells in supine 2 x 10 RTT    TDN as noted above  Sitting:    Seated repeated lumbar flexion 2 x 10 (comparable sign is standing flexion)  ND    Standing:   Multifidus extension 1 x 5 R/L with trunk supported by plinth ND Ther ex:     Prone:  Left prone knee flexion for nerve mobility x15  Prone foot pushes x 10, 3\" ND  Supine:  BKFO x 10 R/L  GSB LTR x 15  GSB DKTC x 15  PROM SKTC, Hip ER and KTOS Left   Active HS stretch on the left x 15      Hooklying isometric adduction x 10, 3\"  TRA bracing in hooklying x 10, 3\"   Supine marches x 10     MICHAEL x 3 min     Clamshells in supine 2 x 10 RTT ND     Ther ex:     Prone:  MICHAEL x 3 min   Left prone knee flexion for nerve mobility x15  Prone foot pushes x 10, 3\" ND  Supine:  BKFO x 10  R/L  GSB LTR x 10 ND  GSB DKTC x 10 ND  PROM SKTC, Hip ER and KTOS Left   Active HS stretch on the left x 15      Hooklying isometric adduction x 10, 3\"  TRA bracing in hooklying x 15, 3\"   Supine marches x 10        Modalities: IFC x 10 min post treatment: continuous 18 V with ice in supine Modalities: IFC x 10 min post treatment: continuous 19 V with ice in supine Modalities: IFC x 10 min post treatment: continuous 21 V with ice in prone Modalities: IFC x 10 min post treatment: continuous 19 V with ice in prone         HEP: multifidus extension, repeated flexion, LTR, supine marches 1/18.  Marches 2/2  Active HS stretch 2/9    Charges: 1 man (14 min), 2 ther ex (28 min)   1 IFC, unatt Estim (11 min)    Total Timed Treatment: 42 min  Total Treatment Time: 53  min     Certification From: 1/17/2024  To:4/16/2024

## 2024-02-14 ENCOUNTER — APPOINTMENT (OUTPATIENT)
Dept: PHYSICAL THERAPY | Age: 34
End: 2024-02-14
Attending: NURSE PRACTITIONER
Payer: OTHER MISCELLANEOUS

## 2024-03-12 ENCOUNTER — TELEPHONE (OUTPATIENT)
Dept: PHYSICAL THERAPY | Facility: HOSPITAL | Age: 34
End: 2024-03-12

## 2025-08-01 ENCOUNTER — WALK IN (OUTPATIENT)
Dept: URGENT CARE | Age: 35
End: 2025-08-01
Attending: FAMILY MEDICINE

## 2025-08-01 VITALS
RESPIRATION RATE: 18 BRPM | TEMPERATURE: 97.8 F | HEIGHT: 68 IN | BODY MASS INDEX: 27.28 KG/M2 | OXYGEN SATURATION: 97 % | SYSTOLIC BLOOD PRESSURE: 120 MMHG | DIASTOLIC BLOOD PRESSURE: 72 MMHG | HEART RATE: 102 BPM | WEIGHT: 180 LBS

## 2025-08-01 DIAGNOSIS — H61.23 BILATERAL IMPACTED CERUMEN: Primary | ICD-10-CM

## 2025-08-01 RX ORDER — ATOMOXETINE 40 MG/1
40 CAPSULE ORAL DAILY
COMMUNITY

## 2025-08-01 RX ORDER — PAROXETINE 40 MG/1
40 TABLET, FILM COATED ORAL EVERY MORNING
COMMUNITY

## 2025-08-05 ENCOUNTER — APPOINTMENT (OUTPATIENT)
Dept: ULTRASOUND IMAGING | Age: 35
End: 2025-08-05

## 2025-08-05 ENCOUNTER — HOSPITAL ENCOUNTER (EMERGENCY)
Age: 35
Discharge: HOME | End: 2025-08-05

## 2025-08-05 VITALS
TEMPERATURE: 97.2 F | RESPIRATION RATE: 16 BRPM | WEIGHT: 184.75 LBS | SYSTOLIC BLOOD PRESSURE: 132 MMHG | BODY MASS INDEX: 28 KG/M2 | DIASTOLIC BLOOD PRESSURE: 87 MMHG | OXYGEN SATURATION: 98 % | HEART RATE: 78 BPM | HEIGHT: 68 IN

## 2025-08-05 DIAGNOSIS — N50.811 RIGHT TESTICULAR PAIN: Primary | ICD-10-CM

## 2025-08-05 LAB
ALBUMIN SERPL-MCNC: 4.6 G/DL (ref 3.4–5)
ALBUMIN/GLOB SERPL: 1.3 {RATIO} (ref 1–2.4)
ALP SERPL-CCNC: 51 UNITS/L (ref 45–117)
ALT SERPL-CCNC: 24 UNITS/L
ANION GAP SERPL CALC-SCNC: 12 MMOL/L (ref 7–19)
APPEARANCE UR: CLEAR
AST SERPL-CCNC: 20 UNITS/L
BASOPHILS # BLD: 0.1 K/MCL (ref 0–0.3)
BASOPHILS NFR BLD: 1 %
BILIRUB SERPL-MCNC: 0.3 MG/DL (ref 0.2–1)
BILIRUB UR QL STRIP: NEGATIVE
BUN SERPL-MCNC: 19 MG/DL (ref 6–20)
BUN/CREAT SERPL: 20 (ref 7–25)
CALCIUM SERPL-MCNC: 9.4 MG/DL (ref 8.4–10.2)
CHLORIDE SERPL-SCNC: 102 MMOL/L (ref 97–110)
CO2 SERPL-SCNC: 27 MMOL/L (ref 21–32)
COLOR UR: NORMAL
CREAT SERPL-MCNC: 0.97 MG/DL (ref 0.67–1.17)
DEPRECATED RDW RBC: 39.7 FL (ref 39–50)
EGFRCR SERPLBLD CKD-EPI 2021: >90 ML/MIN/{1.73_M2}
EOSINOPHIL # BLD: 0.1 K/MCL (ref 0–0.5)
EOSINOPHIL NFR BLD: 1 %
ERYTHROCYTE [DISTWIDTH] IN BLOOD: 12.1 % (ref 11–15)
FASTING DURATION TIME PATIENT: NORMAL H
GLOBULIN SER-MCNC: 3.6 G/DL (ref 2–4)
GLUCOSE SERPL-MCNC: 93 MG/DL (ref 70–99)
GLUCOSE UR STRIP-MCNC: NEGATIVE MG/DL
HCT VFR BLD CALC: 48.2 % (ref 39–51)
HGB BLD-MCNC: 16.3 G/DL (ref 13–17)
HGB UR QL STRIP: NEGATIVE
IMM GRANULOCYTES # BLD AUTO: 0 K/MCL (ref 0–0.2)
IMM GRANULOCYTES # BLD: 0 %
KETONES UR STRIP-MCNC: NEGATIVE MG/DL
LEUKOCYTE ESTERASE UR QL STRIP: NEGATIVE
LYMPHOCYTES # BLD: 2.6 K/MCL (ref 1–4.8)
LYMPHOCYTES NFR BLD: 27 %
MCH RBC QN AUTO: 30.2 PG (ref 26–34)
MCHC RBC AUTO-ENTMCNC: 33.8 G/DL (ref 32–36.5)
MCV RBC AUTO: 89.4 FL (ref 78–100)
MONOCYTES # BLD: 0.9 K/MCL (ref 0.3–0.9)
MONOCYTES NFR BLD: 10 %
NEUTROPHILS # BLD: 5.7 K/MCL (ref 1.8–7.7)
NEUTROPHILS NFR BLD: 61 %
NITRITE UR QL STRIP: NEGATIVE
NRBC BLD MANUAL-RTO: 0 /100 WBC
PH UR STRIP: 6.5 [PH] (ref 5–7)
PLATELET # BLD AUTO: 271 K/MCL (ref 140–450)
POTASSIUM SERPL-SCNC: 4.2 MMOL/L (ref 3.4–5.1)
PROT SERPL-MCNC: 8.2 G/DL (ref 6.4–8.2)
PROT UR STRIP-MCNC: NEGATIVE MG/DL
RBC # BLD: 5.39 MIL/MCL (ref 4.5–5.9)
SODIUM SERPL-SCNC: 137 MMOL/L (ref 135–145)
SP GR UR STRIP: 1.02 (ref 1–1.03)
UROBILINOGEN UR STRIP-MCNC: 0.2 MG/DL
WBC # BLD: 9.4 K/MCL (ref 4.2–11)

## 2025-08-05 PROCEDURE — 80053 COMPREHEN METABOLIC PANEL: CPT | Performed by: STUDENT IN AN ORGANIZED HEALTH CARE EDUCATION/TRAINING PROGRAM

## 2025-08-05 PROCEDURE — 10002800 HB RX 250 W HCPCS

## 2025-08-05 PROCEDURE — 96374 THER/PROPH/DIAG INJ IV PUSH: CPT

## 2025-08-05 PROCEDURE — 99284 EMERGENCY DEPT VISIT MOD MDM: CPT

## 2025-08-05 PROCEDURE — 81003 URINALYSIS AUTO W/O SCOPE: CPT | Performed by: STUDENT IN AN ORGANIZED HEALTH CARE EDUCATION/TRAINING PROGRAM

## 2025-08-05 PROCEDURE — 87491 CHLMYD TRACH DNA AMP PROBE: CPT | Performed by: STUDENT IN AN ORGANIZED HEALTH CARE EDUCATION/TRAINING PROGRAM

## 2025-08-05 PROCEDURE — 93975 VASCULAR STUDY: CPT

## 2025-08-05 PROCEDURE — 85025 COMPLETE CBC W/AUTO DIFF WBC: CPT | Performed by: STUDENT IN AN ORGANIZED HEALTH CARE EDUCATION/TRAINING PROGRAM

## 2025-08-05 RX ORDER — KETOROLAC TROMETHAMINE 15 MG/ML
15 INJECTION, SOLUTION INTRAMUSCULAR; INTRAVENOUS ONCE
Status: COMPLETED | OUTPATIENT
Start: 2025-08-05 | End: 2025-08-05

## 2025-08-05 RX ADMIN — KETOROLAC TROMETHAMINE 15 MG: 15 INJECTION, SOLUTION INTRAMUSCULAR; INTRAVENOUS at 18:19

## 2025-08-05 ASSESSMENT — PAIN SCALES - GENERAL
PAINLEVEL_OUTOF10: 3
PAINLEVEL_OUTOF10: 6
PAINLEVEL_OUTOF10: 5

## 2025-08-06 LAB
C TRACH RRNA UR QL NAA+PROBE: NEGATIVE
N GONORRHOEA RRNA UR QL NAA+PROBE: NEGATIVE
SERVICE CMNT-IMP: NORMAL

## (undated) NOTE — LETTER
Patient Name: Arturo Chaudhary  YOB: 1990          MRN number:  [id number]  Date:  1/17/2024  Referring Physician:  Bee An         SPINE EVALUATION:     Diagnosis:   Acute midline low back pain with left-sided sciatica (M54.42)       Referring Provider: Juve  Date of Evaluation:    1/17/2024    Precautions:  None Next MD visit:   none scheduled  Date of Surgery: n/a     PATIENT SUMMARY   Arturo Chaudhary is a 33 year old male who presents to therapy today with complaints of central and L back pain after lifting and twisting with a box from the ground at work on 1/2/24. 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 He was able to work the rest of the shift on the date of injury, but he has been unable to return to work due to pain. 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 The lifting height is usually variable from floor to shoulder height as he unloads pallets.  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 The pain has gone down from the first week as he was limited by walking in a forward flexed posture, but has not significantly improved from the first week. The pain radiates to the knee along the front of the thigh; this is a sharp pain that sometimes extending to the shin. The pain in the back is a sharp pain. The pain feels like its more nerve instead of muscular and it is constant. The knee pain is a shooting pain the comes and goes. Moving around bothers him, but rest seems to help. He is able to  his child but feels pain, sleep positioning, bending forward and donning shoes/socks. He has been staying away from pain medications. He has been icing and using heat but the ice helps the most. He got some lidocaine patches today from the MD so he is going to try those today. He denies any numbness or tingling.  Pt describes pain level  current 5/10, at best 5/10, at worst 9/10.   Current functional limitations include lifting heavy/avoiding all lifting, prolonged standing/walking, pain with bending forward, donning shoes, sleep positioning; lifting and standing restrictions limit him from returning to work at this time    Arturo describes prior level of function no restrictions with any lifting. Pt goals include pain free with lifting, and return to work.  Past medical history was reviewed with Arturo. No significant findings reported by patient  Pt denies diplopia, dysarthria, dysphasia, dizziness, drop attacks, bowel/bladder changes, saddle anesthesia, and PATRIA LE N/T.    ASSESSMENT  Arturo presents to physical therapy evaluation with primary c/o middle and L low back pain with intermittent pain into L leg. The results of the objective tests and measures show decreased lumbar AROM, increased tenderness of the paraspinals, QL, and glutes, decreased hip strength, and hypomobility of the lumbar vertebral joints which are all contributing to his pain complaints and limitations with ADLS/work duties. Functional deficits include but are not limited to lifting heavy, pain with repeated motions, donning shoes and sleep positioning.  Signs and symptoms are consistent with diagnosis of acute low back pain. Pt and PT discussed evaluation findings, pathology, POC and HEP.  Pt voiced understanding and performs HEP correctly without reported pain. Skilled Physical Therapy is medically necessary to address the above impairments and reach functional goals.     OBJECTIVE:   Observation/Posture: Patient stands with  level iliac crest, bilateral forefoot out toeing, medial rotation of bilateral femurs and increased right lateral lean  Neuro Screen: intact to light touch     Lumbar AROM: (* denotes performed with pain)  Flexion: 25% LOM no reversal of curve * left low back pain  Extension: 50%  LOM with pain in middle and L back *    Sidebending: R 25% LOM and pain* ; L  50% LOM with pain *  Rotation: R WFL and pain free ; L WFL with pain in the low back and buttocks *    Accessory motion: hypomobile to L4 and L5 A/P at the spinous processes  Palpation: TTP to L quadratus lumborum, L superior glute, paraspinals, L SI joint. Increased visible swelling on the L paraspinals  compared to the R     Strength: (* denotes performed with pain)  LE   Hip flexion (L2): R 3+*/5; L 3+*/5  Hip abduction: R 3+*/5; L 3+*/5  Hip Extension: R 3+*/5; L 3*/5   Hip ER: R 4/5; L 4+/5  Hip IR: R 5/5; L 4+/5  Knee Flexion: R 5/5; L 5/5   Knee extension (L3): R 5/5; L 4+*/5   DF (L4): R 5/5; L 5/5  PF (S1): R 5/5; L 5/5    Lower abdominals: 1-/5       Flexibility:   LE   Hamstrings: R -50; L -40  Quads: R WFL; L WFL     Special tests:   Slump: Neg  SLR: R neg, L neg  Active SLR: R crossover pain to L, L neg   MICHAEL: No pain into leg   Spring SI: neg     Gait: pt ambulates on level ground with normal mechanics.  Balance: SLS R 5 sec*, L 6 sec *    Today’s Treatment and Response:   Pt education was provided on exam findings, treatment diagnosis, treatment plan, expectations, and prognosis. Pt was also provided recommendations for possible soreness after evaluation and modalities as needed [ice/heat]  Patient was instructed in and issued a HEP for: MICHAEL, lumbar trunk rotations, brace and march in supine     Charges: PT Eval Moderate Complexity, TherEx x1       Total Timed Treatment: 8 min     Total Treatment Time: 45 min Interventions performed at the initial evaluation: MICHAEL x 1 min, prone lying x 2 min, LTR x 10 and brace with march x 5 alternating      Based on clinical rationale and outcome measures, this evaluation involved Moderate Complexity decision making due to 1-2 personal factors/comorbidities, 3 body structures involved/activity limitations, and unstable symptoms including changing pain levels and shooting pain into L leg intermittently  .  PLAN OF CARE:    Goals: (to be met in 8 visits)   Pt will  report 90% or more decrease in pain with motion throughout the day to allow him to return to work.   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   Pt will increase lumbar ROM by 25% to 50% to be able to bend and squat to  boxes from pallet at work.   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  Pt will be independent with progressive HEP in 2 weeks.     Frequency / Duration: Patient will be seen for 2 x/week or a total of 8 visits over a 90 day period. Treatment will include: Manual Therapy, Neuromuscular Re-education, Therapeutic Exercise, and Home Exercise Program instruction    Education or treatment limitation: None  Rehab Potential:good    Oswestry Disability Index Score  No data recorded - to be taken next visit    Patient/Family/Caregiver was advised of these findings, precautions, and treatment options and has agreed to actively participate in planning and for this course of care.    Thank you for your referral. Please co-sign or sign and return this letter via fax as soon as possible to [phone]. If you have any questions, please contact me at Dept: [phone]    Sincerely,  Electronically signed by therapist: Geovanny Horn    Physician's certification required: Yes  I certify the need for these services furnished under this plan of treatment and while under my care.    X___________________________________________________ Date____________________    Certification From: 1/17/2024  To:4/16/2024      21st Century Cures Act Notice to Patient: Medical documents like this are made available to patients in the interest of transparency. However, be advised this is a medical document and  it is intended as [id number] communication between your medical providers. This medical document may contain abbreviations, assessments, medical data, and results or other terms that are unfamiliar. Medical documents are intended to carry relevant information, facts as evident, and the clinical opinion of the practitioner. As such, this medical document may be written in language that appears blunt or direct. You are encouraged to contact your medical provider and/or [facility] Patient Experience if you have any questions about this medical document.

## (undated) NOTE — LETTER
Patient Name: Arturo Chaudhary  YOB: 1990          MRN :  [id number]  Date:  2/9/2024  Referring Physician:  Bee An    Progress Summary  Pt has attended 8 visits in Physical Therapy.      Diagnosis:   Acute midline low back pain with left-sided sciatica (M54.42)          Referring Provider: Juve  Date of Evaluation:    11/17/24    Precautions:  None Next MD visit:   none scheduled  Date of Surgery: n/a   Insurance Primary/Secondary: WORKERS COMP / N/A     # Auth Visits: 8  auth  until  4/16/24      Subjective: Patient reports that he continues to have pain in the low back that extends into the left leg. He states he followed up with the MD yesterday and he was recommended to start with injections to see if that manages his pain as he had a MRI and was told that he has a herniated disc. He is having restrictions with sleeping and the MD is planning on writing him a Rx for Gabapentin.  He feels that overall his pain is up and down.  He locates numbness at the dorsum of the left foot, sharp shooting pain at the anterior leg all the way to the foot. He also describes a sharp, shooting pain in the left side of the buttocks and a stiffness/tightness in the left side of the low back.  He states the the leg symptoms are up and down with how far down the leg the symptoms extends.  Today he is not as sore as he was at the last session. His max pain has been 8/10.  He has had some improvement with donning shoes, but this is still painful at the end of the day.  He reports compliance with his HEP. 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     Current functional limitations include lifting heavy/avoiding all lifting, prolonged standing/walking, pain with bending forward, sleep positioning; lifting and standing restrictions limit him from  returning to work at this time  Pain: 6-7/10      Objective: See treatment log     Observation/Posture: Patient stands with  level iliac crest, bilateral forefoot out toeing, medial rotation of bilateral femurs and increased right lateral lean  Neuro Screen: intact to light touch     Lumbar AROM: (* denotes performed with pain)  Flexion: 25% decrease in range * left low back pain  Extension: 50% decrease in range and pain in the left low back *  Sidebending: R 25% LOM and pain central back* ; L 50% MALLORY with pain in the left low back*  Rotation: R WFL and pain free ; L WFL and pain free     Accessory motion: hypomobile to L4 and L5 A/P at the spinous processes  Palpation: TTP to L quadratus lumborum, left gluts, left lumbar paraspinals, L SI joint and left piriformis. Increased visible and palpable swelling on the L paraspinals  compared to the R     Strength: (* denotes performed with pain)  LE   Hip flexion (L2): R 4-/5; L 34-/5  Hip abduction: R 4/5; L 4/5  Hip Extension: R 3+*/5; L 3*/5   Hip ER: R 4+/5; L 4+/5  Hip IR: R 5/5; L 4+/5  Knee Flexion: R 5/5; L 5/5   Knee extension (L3): R 5/5; L 4+/5   DF (L4): R 5/5; L 5/5  PF (S1): R 5/5; L 5/5    Lower abdominals: 1+/5       Flexibility:   LE   Hamstrings: R -45; L -40  Quads: R WFL; L WFL     Special tests:   Slump: Neg  SLR: R neg, L neg  Active SLR: R crossover pain to L, L neg   MICHAEL: No pain into leg   VERONICA positive Left      Gait: pt ambulates on level ground with normal mechanics but decreased gait speed.  Balance: SLS R 10 sec*, L 11 sec *      TDN screening:  Patient answered no to the following screening questions or as indicated below    1. No allergy to chromium or nickel  2. No system or  blood borne infections present (I.e. Hepatitis, HIV)  3. No presence of pacemaker or cosmetic implant  4. Not taking anticoagulant therapies     Trigger Point Dry Needling  Therapist provided thorough explanation of risks and benefits of trigger dry  needling.  Patient provided verbal informed consent to receive dry needling services.  Intervention was provided by Fabiola Varela PT, DTP, OCS, CMTPT    Procedure:  Functional assessment sign (pre-test): flexion    Anatomical region/Muscle(s) treated: psoas and quadratus lumborum on left   Pistoning technique used with needle size: 30 x 75 and 30 x 60   Twitch elicited (Y/N): Y  Patient reported no adverse effects following intervention.    Re-assessment sign (post-test):flexion     Assessment: Patient has made gains in his range of motion and strenght since beginning physical therapy. He continues to have pain limiting his ability to perform flexion and insufficient strength and stability for lifting and carrying as needed to complete job duties; however,er his symptoms are less irritable with strength testing. He does have more radicular symptoms and would benefit from exercises aimed at centralizing his symptoms and improving his mobility to help manage back and leg pain. 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      Goals:   (to be met in 8 visits)   Pt will report 90% or more decrease in pain with motion throughout the day to allow him to return to work.  WORKING TOWARD   Pt will increase hip strength by 1/2 grade or more to be able to lift heavy objects off the ground to complete work duties WORKING TOWARD  Pt will report a 80% decrease in the instances of shooting pain into the L leg throughout the day to be able stand to transfer boxes pain free at work.  WORKING TOWARD   Pt will increase lumbar ROM by 25% to 50% to be able to bend and squat to  boxes from pallet at work. PARTIALLY ACHIEVED  Pt will increase low ab strength to 2/5 as needed to control low back  stresses with twisting to transfer boxes to the hopper at work. WORKING TOWARD   Pt will be independent with progressive HEP in 2 weeks. MET      Plan: Continue skilled Physical Therapy 2 x/week or a total of 10 visits over a 90 day period. Treatment will include: manual therapy, range of motion exercises, flexibility exercises, strengthening exercises, postural re-ed, neuromuscular re-education, CKC exercises, joint mobilization, IASTM, TDN, IFC, and HEP instruction all to improve her functional independence        Patient/Family/Caregiver was advised of these findings, precautions, and treatment options and has agreed to actively participate in planning and for this course of care.    Thank you for your referral. If you have any questions, please contact me at Dept: [phone].    Sincerely,  Electronically signed by therapist: Fabiola Varela PT     Physician's certification required:  Yes  Please co-sign or sign and return this letter via fax as soon as possible to [phone].   I certify the need for these services furnished under this plan of treatment and while under my care.    X___________________________________________________ Date____________________    Certification From: 2/9/2024  To:5/9/2024 21st Century Cures Act Notice to Patient: Medical documents like this are made available to patients in the interest of transparency. However, be advised this is a medical document and it is intended as [id number] communication between your medical providers. This medical document may contain abbreviations, assessments, medical data, and results or other terms that are unfamiliar. Medical documents are intended to carry relevant information, facts as evident, and the clinical opinion of the practitioner. As such, this medical document may be written in language that appears blunt or direct. You are encouraged to contact your medical provider and/or [facility] Patient  Experience if you have any questions about this medical document.

## (undated) NOTE — LETTER
Patient Name: Arturo Chaudhary  YOB: 1990          MRN number:  [id number]  Date:  2/9/2024  Referring Physician:  Bee An         Progress Summary  Pt has attended 8 visits in Physical Therapy.      Diagnosis:   Acute midline low back pain with left-sided sciatica (M54.42)          Referring Provider: Juve  Date of Evaluation:    11/17/24    Precautions:  None Next MD visit:   none scheduled  Date of Surgery: n/a   Insurance Primary/Secondary: WORK[employer] COMP / N/A     # Auth Visits: 8  auth  until  4/16/24      Subjective: Patient reports that he continues to have pain in the low back that extends into the left leg. He states he followed up with the MD yesterday and he was recommended to start with injections to see if that manages his pain as he had a MRI and was told that he has a herniated disc. He is having restrictions with sleeping and the MD is planning on writing him a Rx for Gabapentin.  He feels that overall his pain is up and down.  He locates numbness at the dorsum of the left foot, sharp shooting pain at the anterior leg all the way to the foot. He also describes a sharp, shooting pain in the left side of the buttocks and a stiffness/tightness in the left side of the low back.  He states the the leg symptoms are up and down with how far down the leg the symptoms extends.  Today he is not as sore as he was at the last session. His max pain has been 8/10.  He has had some improvement with donning shoes, but this is still painful at the end of the day.  He reports compliance with his HEP. 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     Current functional limitations include lifting heavy/avoiding all lifting, prolonged standing/walking, pain with bending forward, sleep positioning; lifting and standing restrictions limit him  from returning to work at this time  Pain: 6-7/10      Objective: See treatment log     Observation/Posture: Patient stands with  level iliac crest, bilateral forefoot out toeing, medial rotation of bilateral femurs and increased right lateral lean  Neuro Screen: intact to light touch     Lumbar AROM: (* denotes performed with pain)  Flexion: 25% decrease in range * left low back pain  Extension: 50% decrease in range and pain in the left low back *  Sidebending: R 25% LOM and pain central back* ; L 50% MALLORY with pain in the left low back*  Rotation: R WFL and pain free ; L WFL and pain free     Accessory motion: hypomobile to L4 and L5 A/P at the spinous processes  Palpation: TTP to L quadratus lumborum, left gluts, left lumbar paraspinals, L SI joint and left piriformis. Increased visible and palpable swelling on the L paraspinals  compared to the R     Strength: (* denotes performed with pain)  LE   Hip flexion (L2): R 4-/5; L 34-/5  Hip abduction: R 4/5; L 4/5  Hip Extension: R 3+*/5; L 3*/5   Hip ER: R 4+/5; L 4+/5  Hip IR: R 5/5; L 4+/5  Knee Flexion: R 5/5; L 5/5   Knee extension (L3): R 5/5; L 4+/5   DF (L4): R 5/5; L 5/5  PF (S1): R 5/5; L 5/5    Lower abdominals: 1+/5       Flexibility:   LE   Hamstrings: R -45; L -40  Quads: R WFL; L WFL     Special tests:   Slump: Neg  SLR: R neg, L neg  Active SLR: R crossover pain to L, L neg   MICHAEL: No pain into leg   VERONICA positive Left      Gait: pt ambulates on level ground with normal mechanics but decreased gait speed.  Balance: SLS R 10 sec*, L 11 sec *      TDN screening:  Patient answered no to the following screening questions or as indicated below    1. No allergy to chromium or nickel  2. No system or  blood borne infections present (I.e. Hepatitis, HIV)  3. No presence of pacemaker or cosmetic implant  4. Not taking anticoagulant therapies     Trigger Point Dry Needling  Therapist provided thorough explanation of risks and benefits of trigger dry  needling.  Patient provided verbal informed consent to receive dry needling services.  Intervention was provided by Fabiola Varela PT, DTP, OCS, CMTPT    Procedure:  Functional assessment sign (pre-test): flexion    Anatomical region/Muscle(s) treated: psoas and quadratus lumborum on left   Pistoning technique used with needle size: 30 x 75 and 30 x 60   Twitch elicited (Y/N): Y  Patient reported no adverse effects following intervention.    Re-assessment sign (post-test):flexion     Assessment: Patient has made gains in his range of motion and strenght since beginning physical therapy. He continues to have pain limiting his ability to perform flexion and insufficient strength and stability for lifting and carrying as needed to complete job duties; however,er his symptoms are less irritable with strength testing. He does have more radicular symptoms and would benefit from exercises aimed at centralizing his symptoms and improving his mobility to help manage back and leg pain. 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      Goals:   (to be met in 8 visits)   Pt will report 90% or more decrease in pain with motion throughout the day to allow him to return to work.  WORKING TOWARD   Pt will increase hip strength by 1/2 grade or more to be able to lift heavy objects off the ground to complete work duties WORKING TOWARD  Pt will report a 80% decrease in the instances of shooting pain into the L leg throughout the day to be able stand to transfer boxes pain free at work.  WORKING TOWARD   Pt will increase lumbar ROM by 25% to 50% to be able to bend and squat to  boxes from pallet at work. PARTIALLY ACHIEVED  Pt will increase low ab strength to 2/5 as needed to control low back  stresses with twisting to transfer boxes to the hopper at work. WORKING TOWARD   Pt will be independent with progressive HEP in 2 weeks. MET      Plan: Continue skilled Physical Therapy 2 x/week or a total of 10 visits over a 90 day period. Treatment will include: manual therapy, range of motion exercises, flexibility exercises, strengthening exercises, postural re-ed, neuromuscular re-education, CKC exercises, joint mobilization, IASTM, TDN, IFC, and HEP instruction all to improve her functional independence        Patient/Family/Caregiver was advised of these findings, precautions, and treatment options and has agreed to actively participate in planning and for this course of care.    Thank you for your referral. If you have any questions, please contact me at Dept: [phone].    Sincerely,  Electronically signed by therapist: Fabiola Varela PT     Physician's certification required:  Yes  Please co-sign or sign and return this letter via fax as soon as possible to [phone].   I certify the need for these services furnished under this plan of treatment and while under my care.    X___________________________________________________ Date____________________    Certification From: 2/9/2024  To:5/9/2024 21st Century Cures Act Notice to Patient: Medical documents like this are made available to patients in the interest of transparency. However, be advised this is a medical document and it is intended as [id number] communication between your medical providers. This medical document may contain abbreviations, assessments, medical data, and results or other terms that are unfamiliar. Medical documents are intended to carry relevant information, facts as evident, and the clinical opinion of the practitioner. As such, this medical document may be written in language that appears blunt or direct. You are encouraged to contact your medical provider and/or [facility] if  you have any questions about this medical document.